# Patient Record
Sex: FEMALE | NOT HISPANIC OR LATINO | URBAN - METROPOLITAN AREA
[De-identification: names, ages, dates, MRNs, and addresses within clinical notes are randomized per-mention and may not be internally consistent; named-entity substitution may affect disease eponyms.]

---

## 2017-09-07 ENCOUNTER — APPOINTMENT (RX ONLY)
Dept: URBAN - METROPOLITAN AREA CLINIC 368 | Facility: CLINIC | Age: 32
Setting detail: DERMATOLOGY
End: 2017-09-07

## 2017-09-07 DIAGNOSIS — L98.8 OTHER SPECIFIED DISORDERS OF THE SKIN AND SUBCUTANEOUS TISSUE: ICD-10-CM

## 2017-09-07 DIAGNOSIS — L71.8 OTHER ROSACEA: ICD-10-CM

## 2017-09-07 DIAGNOSIS — L90.5 SCAR CONDITIONS AND FIBROSIS OF SKIN: ICD-10-CM

## 2017-09-07 PROBLEM — L90.8 OTHER ATROPHIC DISORDERS OF SKIN: Status: ACTIVE | Noted: 2017-09-07

## 2017-09-07 PROCEDURE — ? COSMETIC CONSULTATION: BOTULINUM TOXIN

## 2017-09-07 PROCEDURE — ? PRESCRIPTION

## 2017-09-07 PROCEDURE — ? TREATMENT REGIMEN

## 2017-09-07 PROCEDURE — 99202 OFFICE O/P NEW SF 15 MIN: CPT

## 2017-09-07 PROCEDURE — ? COUNSELING

## 2017-09-07 RX ORDER — AZELAIC ACID 0.15 G/G
AEROSOL, FOAM TOPICAL
Qty: 1 | Refills: 1 | Status: ERX | COMMUNITY
Start: 2017-09-07

## 2017-09-07 RX ORDER — DOXYCYCLINE HYCLATE 150 MG/1
TABLET, COATED ORAL
Qty: 30 | Refills: 2 | Status: ERX | COMMUNITY
Start: 2017-09-07

## 2017-09-07 RX ORDER — DAPSONE 75 MG/G
GEL TOPICAL
Qty: 1 | Refills: 4 | Status: ERX | COMMUNITY
Start: 2017-09-07

## 2017-09-07 RX ADMIN — DOXYCYCLINE HYCLATE: 150 TABLET, COATED ORAL at 00:00

## 2017-09-07 RX ADMIN — DAPSONE: 75 GEL TOPICAL at 00:00

## 2017-09-07 RX ADMIN — AZELAIC ACID: 0.15 AEROSOL, FOAM TOPICAL at 00:00

## 2017-09-07 ASSESSMENT — LOCATION SIMPLE DESCRIPTION DERM
LOCATION SIMPLE: RIGHT FOREHEAD
LOCATION SIMPLE: RIGHT CHEEK
LOCATION SIMPLE: LEFT FOREHEAD
LOCATION SIMPLE: LEFT CHEEK

## 2017-09-07 ASSESSMENT — LOCATION DETAILED DESCRIPTION DERM
LOCATION DETAILED: RIGHT FOREHEAD
LOCATION DETAILED: LEFT MEDIAL FOREHEAD
LOCATION DETAILED: LEFT INFERIOR CENTRAL MALAR CHEEK
LOCATION DETAILED: RIGHT INFERIOR CENTRAL MALAR CHEEK
LOCATION DETAILED: LEFT CENTRAL MALAR CHEEK

## 2017-09-07 ASSESSMENT — LOCATION ZONE DERM: LOCATION ZONE: FACE

## 2017-09-07 NOTE — HPI: PIMPLES (NODULAR ACNE)
How Severe Is Your Nodular Acne?: moderate
Is This A New Presentation, Or A Follow-Up?: Acne
Females Only: When Was Your Last Menstrual Period?: 09/04/2017

## 2017-09-07 NOTE — PROCEDURE: TREATMENT REGIMEN
Detail Level: Simple
Initiate Treatment: AM\\n1.  Wash your face with a gentle cleanser \\n2.  Pat skin dry\\n3.  Apply a thin layer of Finacea Foam Daily \\n4.  Apply an oil free moisturizer with SPF\\n\\nTake one tablet of Acticlate 150 mg by mouth daily with food \\n\\nPM\\n1.  Wash your face with a gentle cleanser \\n2.  Pat skin dry\\n3.  Apply a thin layer of Aczone gel 7.5%\\n4.  Apply an oil free moisturizer as needed for dryness
Samples Given: Aczone,  Finacea Foam, Acticlate 150 mg
Plan: Discussed Botox for forehead lines - will start with a small amount to avoid further drooping of eyelids which patient is concerned about. \\n\\nDiscussed Botox for lateral eyebrow lift \\n\\nDiscussed ultherapy to lift brow \\n\\nDiscussed filler for forehead lines - Discussed that Botox would likely yield better results\\n\\n
Plan: Discussed Icon vs. Erbium laser for acne scarring\\n\\nDiscussed surgical scar revision of largest pock sheng scars prior to laser treatment\\n\\n
Detail Level: Zone

## 2020-10-17 ENCOUNTER — RX ONLY (OUTPATIENT)
Age: 35
Setting detail: RX ONLY
End: 2020-10-17

## 2020-10-17 ENCOUNTER — APPOINTMENT (RX ONLY)
Dept: URBAN - METROPOLITAN AREA CLINIC 369 | Facility: CLINIC | Age: 35
Setting detail: DERMATOLOGY
End: 2020-10-17

## 2020-10-17 DIAGNOSIS — L70.0 ACNE VULGARIS: ICD-10-CM

## 2020-10-17 DIAGNOSIS — L90.5 SCAR CONDITIONS AND FIBROSIS OF SKIN: ICD-10-CM

## 2020-10-17 DIAGNOSIS — L71.8 OTHER ROSACEA: ICD-10-CM

## 2020-10-17 PROCEDURE — ? RECOMMENDATIONS

## 2020-10-17 PROCEDURE — ? TREATMENT REGIMEN

## 2020-10-17 PROCEDURE — ? DIAGNOSIS COMMENT

## 2020-10-17 PROCEDURE — ? MEDICAL CONSULTATION: PULSED-DYE LASER

## 2020-10-17 PROCEDURE — ? COUNSELING

## 2020-10-17 PROCEDURE — 99214 OFFICE O/P EST MOD 30 MIN: CPT

## 2020-10-17 PROCEDURE — ? PRESCRIPTION

## 2020-10-17 PROCEDURE — ? MEDICAL CONSULTATION: FRACTIONAL RESURFACING

## 2020-10-17 RX ORDER — SULFACETAMIDE SODIUM, SULFUR 98; 48 MG/G; MG/G
LIQUID TOPICAL
Qty: 1 | Refills: 2 | Status: ERX | COMMUNITY
Start: 2020-10-17

## 2020-10-17 RX ORDER — SPIRONOLACTONE 25 MG/1
TABLET, FILM COATED ORAL
Qty: 90 | Refills: 5 | Status: ERX | COMMUNITY
Start: 2020-10-17

## 2020-10-17 RX ORDER — DAPSONE 75 MG/G
GEL TOPICAL
Qty: 1 | Refills: 2 | Status: CANCELLED | COMMUNITY
Start: 2020-10-17

## 2020-10-17 RX ORDER — DAPSONE 75 MG/G
GEL TOPICAL
Qty: 1 | Refills: 2 | Status: ERX | COMMUNITY
Start: 2020-10-17

## 2020-10-17 RX ORDER — SULFACETAMIDE SODIUM, SULFUR 98; 48 MG/G; MG/G
LIQUID TOPICAL
Qty: 1 | Refills: 2 | Status: CANCELLED | COMMUNITY
Start: 2020-10-17

## 2020-10-17 RX ADMIN — SULFACETAMIDE SODIUM, SULFUR: 98; 48 LIQUID TOPICAL at 00:00

## 2020-10-17 RX ADMIN — DAPSONE: 75 GEL TOPICAL at 00:00

## 2020-10-17 RX ADMIN — SPIRONOLACTONE: 25 TABLET, FILM COATED ORAL at 00:00

## 2020-10-17 ASSESSMENT — LOCATION DETAILED DESCRIPTION DERM
LOCATION DETAILED: RIGHT MEDIAL FOREHEAD
LOCATION DETAILED: LEFT MEDIAL FOREHEAD
LOCATION DETAILED: RIGHT INFERIOR CENTRAL MALAR CHEEK
LOCATION DETAILED: LEFT INFERIOR CENTRAL MALAR CHEEK
LOCATION DETAILED: INFERIOR MID FOREHEAD

## 2020-10-17 ASSESSMENT — LOCATION SIMPLE DESCRIPTION DERM
LOCATION SIMPLE: INFERIOR FOREHEAD
LOCATION SIMPLE: LEFT FOREHEAD
LOCATION SIMPLE: LEFT CHEEK
LOCATION SIMPLE: RIGHT FOREHEAD
LOCATION SIMPLE: RIGHT CHEEK

## 2020-10-17 ASSESSMENT — LOCATION ZONE DERM: LOCATION ZONE: FACE

## 2020-10-17 NOTE — PROCEDURE: RECOMMENDATIONS
Recommendations (Free Text): Fraxel C02 for acne scarring once acne is under control
Recommendation Preamble: The following recommendations were made during the visit:
Detail Level: Zone

## 2020-10-17 NOTE — HPI: PIMPLES (ACNE)
What Type Of Note Output Would You Prefer (Optional)?: Standard Output
How Severe Is Your Acne?: severe
Is This A New Presentation, Or A Follow-Up?: Acne
Additional Comments (Use Complete Sentences): History of PCOS. Not on meds, did not tolerate metformin in past. Did have hormonal evaluation by endocrinologist in the past.

## 2020-10-17 NOTE — PROCEDURE: TREATMENT REGIMEN
Initiate Treatment: Topically:\\nAM\\n1.  Wash your face with Plexion cleanser \\n2.  Pat skin dry\\n3.  Apply a thin layer of Aczone gel to the entire face  \\n4.  Apply an oil free moisturizer with SPF\\n\\nPM\\n1.  Wash your face with Plexion cleander\\n2.  Pat skin dry\\n3.  Apply an oil free moisturizer as needed for dryness\\n\\nOrally:\\nTake spironolactone 75 mg daily.\\nDiscussed spironolactone with the patient along with risks and benefits.
Plan: Since patient also has rosacea, avoiding topical retinoids at this time\\nCombo of Spironolactone, Aczone and Plexion to address hormonal acne\\nPDL laser to address rosacea
Detail Level: Zone